# Patient Record
Sex: MALE | Race: WHITE | NOT HISPANIC OR LATINO | URBAN - METROPOLITAN AREA
[De-identification: names, ages, dates, MRNs, and addresses within clinical notes are randomized per-mention and may not be internally consistent; named-entity substitution may affect disease eponyms.]

---

## 2017-08-28 ENCOUNTER — COMMUNICATION - HEALTHEAST (OUTPATIENT)
Dept: SCHEDULING | Facility: CLINIC | Age: 52
End: 2017-08-28

## 2017-09-11 ENCOUNTER — OFFICE VISIT - HEALTHEAST (OUTPATIENT)
Dept: FAMILY MEDICINE | Facility: CLINIC | Age: 52
End: 2017-09-11

## 2017-09-11 DIAGNOSIS — K80.20 CHOLELITHIASIS: ICD-10-CM

## 2017-09-11 DIAGNOSIS — Z01.818 PREOP GENERAL PHYSICAL EXAM: ICD-10-CM

## 2017-09-11 DIAGNOSIS — Z00.00 HEALTHCARE MAINTENANCE: ICD-10-CM

## 2017-09-11 ASSESSMENT — MIFFLIN-ST. JEOR: SCORE: 1698.1

## 2017-09-12 ASSESSMENT — MIFFLIN-ST. JEOR: SCORE: 1696.29

## 2017-09-15 ENCOUNTER — SURGERY - HEALTHEAST (OUTPATIENT)
Dept: SURGERY | Facility: CLINIC | Age: 52
End: 2017-09-15

## 2017-09-15 ENCOUNTER — RECORDS - HEALTHEAST (OUTPATIENT)
Dept: ADMINISTRATIVE | Facility: OTHER | Age: 52
End: 2017-09-15

## 2017-09-15 ENCOUNTER — ANESTHESIA - HEALTHEAST (OUTPATIENT)
Dept: SURGERY | Facility: CLINIC | Age: 52
End: 2017-09-15

## 2017-09-15 ASSESSMENT — MIFFLIN-ST. JEOR: SCORE: 1696.29

## 2018-02-20 ENCOUNTER — COMMUNICATION - HEALTHEAST (OUTPATIENT)
Dept: FAMILY MEDICINE | Facility: CLINIC | Age: 53
End: 2018-02-20

## 2021-05-30 ENCOUNTER — RECORDS - HEALTHEAST (OUTPATIENT)
Dept: ADMINISTRATIVE | Facility: CLINIC | Age: 56
End: 2021-05-30

## 2021-05-31 VITALS — WEIGHT: 199 LBS | BODY MASS INDEX: 31.23 KG/M2 | HEIGHT: 67 IN

## 2021-05-31 VITALS — HEIGHT: 67 IN | WEIGHT: 199.4 LBS | BODY MASS INDEX: 31.3 KG/M2

## 2021-06-12 NOTE — PROGRESS NOTES
Assessment:     1. Preop general physical exam  - Hemoglobin    2. Cholelithiasis    3. Healthcare maintenance  - Td, Adult, Adsorbed (blue label)   51 y.o. male with planned surgery laparoscopic cholecystectomy for cholelithiasis.  Known risk factors for perioperative complications: None    Difficulty with intubation is not anticipated.    Cardiac Risk Estimation: Has no personal history of CVD or any symptoms or risks.Has no family history of CVD there is also no family history of blood clot as well as bleeding with surgery.  He has never had any surgery in the past..Thomas Brizuela is active and exercises adequately.Has more than 4 METs Metabolic equivalents.  Surgical risk: Class I  Perioperative cardiac risk: He has 0.4% risk of major cardiac event perioperatively.  There is no cardiovascular contraindication for the planned surgery.  Counseling done for adequate hydration and early ambulation.  Also discussed expectations postsurgery.    Current medications which may produce withdrawal symptoms if withheld perioperatively: None     Plan:      1. Preoperative workup as follows hemoglobin.  2. Change in medication regimen before surgery: not applicable, not on any medications.  3. Prophylaxis for cardiac events with perioperative beta-blockers: not indicated.  4. Invasive hemodynamic monitoring perioperatively: not indicated.  5. Deep vein thrombosis prophylaxis postoperatively:Not applicable..  6. Surveillance for postoperative MI with ECG immediately postoperatively and on postoperative days 1 and 2 AND troponin levels 24 hours postoperatively and on day 4 or hospital discharge (whichever comes first): not indicated.       Subjective:      Thomas Brizuela is a 51 y.o. male who presents to the office today for a preoperative consultation at the request of surgeon Dr Zelaya who plans on performing laparoscopic cholecystectomy on September 15.  He was diagnosed with cholelithiasis at the emergency  room following evaluation for chest pain that took the patient to the emergency room.  This consultation is requested for the specific conditions prompting preoperative evaluation (i.e. because of potential affect on operative risk): Cardiovascular risk stratification as well as review of comorbid medical concerns as needed.. Planned anesthesia: Possibly general anesthesia both at the discretion of the anesthetist and surgeon.. The patient has the following known anesthesia issues: No personal history of anesthesia complications, no family history also of anesthesia complications.. Patients bleeding risk: no remote history of abnormal bleeding.     History reviewed. No pertinent past medical history.  History reviewed. No pertinent surgical history.  Social History     Social History     Marital status:      Spouse name: N/A     Number of children: 1     Years of education: N/A     Social History Main Topics     Smoking status: Never Smoker     Smokeless tobacco: Never Used     Alcohol use Yes      Comment: Occasionally     Drug use: No     Sexual activity: Yes     Partners: Female     Other Topics Concern     None     Social History Narrative     Family History   Problem Relation Age of Onset     Thyroid disease Mother      COPD Mother      Colon polyps Father      Allergies   Allergen Reactions     No Known Drug Allergies      Other Environmental Allergy      Seasonal spring allergies     Current Outpatient Prescriptions   Medication Sig Dispense Refill     ondansetron (ZOFRAN ODT) 4 MG disintegrating tablet Take 1-2 tablets (4-8 mg total) by mouth every 8 (eight) hours as needed. 20 tablet 0     No current facility-administered medications for this visit.      Review of Systems  Constitutional: negative for anorexia, fatigue, sweats and weight loss  Eyes: negative  Ears, nose, mouth, throat, and face: negative  Respiratory: negative for asthma, cough and dyspnea on exertion  Cardiovascular: negative for  "chest pain, fatigue and palpitations  Gastrointestinal: negative  Genitourinary:negative  Integument/breast: negative  Hematologic/lymphatic: negative  Musculoskeletal:negative  Neurological: negative  Behavioral/Psych: negative  Endocrine: negative for diabetic symptoms including blurry vision, polyphagia, polyuria, poor wound healing and weight loss  Allergic/Immunologic: negative      Objective:      /70  Pulse 85  Temp 97.7  F (36.5  C) (Oral)   Ht 5' 7\" (1.702 m)  Wt 199 lb 6.4 oz (90.4 kg)  SpO2 97%  BMI 31.23 kg/m2  General appearance: alert, appears stated age and cooperative  Head: Normocephalic, without obvious abnormality, atraumatic  Eyes: conjunctivae/corneas clear. PERRL, EOM's intact. Fundi benign.  Ears: normal TM's and external ear canals both ears  Throat: lips, mucosa, and tongue normal; teeth and gums normal  Neck: no adenopathy, supple, symmetrical, trachea midline and thyroid not enlarged, symmetric, no tenderness/mass/nodules  Lungs: clear to auscultation bilaterally  Chest wall: no tenderness  Heart: regular rate and rhythm, S1, S2 normal, no murmur, click, rub or gallop  Abdomen: soft, non-tender; bowel sounds normal; no masses,  no organomegaly  Extremities: extremities normal, atraumatic, no cyanosis or edema  Pulses: 2+ and symmetric  Skin: Skin color, texture, turgor normal. No rashes or lesions  Lymph nodes: Negative cervical lymphadenopathy.  Neurologic: Alert and oriented X 3, normal strength and tone. Normal symmetric reflexes. Normal coordination and gait    Predictors of intubation difficulty:   Morbid obesity? no   Anatomically abnormal facies? no   Prominent incisors? no   Receding mandible? no   Short, thick neck? no   Neck range of motion: normal   Mallampati score: III (soft and hard palate and base of uvula visible)    Dentition: No chipped, loose, or missing teeth.    Cardiographics  ECG: See EKG report done 8/28/2017 which was normal sinus rhythm.  Lab: Awaiting " results of hemoglobin.

## 2021-06-13 NOTE — ANESTHESIA CARE TRANSFER NOTE
Last vitals:   Vitals:    09/15/17 1046   BP: 116/76   Pulse: 79   Resp: 12   Temp: 37.1  C (98.7  F)   SpO2: 97%     Patient's level of consciousness is drowsy  Spontaneous respirations: yes  Maintains airway independently: yes  Dentition unchanged: yes  Oropharynx: oropharynx clear of all foreign objects    QCDR Measures:  ASA# 20 - Surgical Safety Checklist: WHO surgical safety checklist completed prior to induction  PQRS# 430 - Adult PONV Prevention: 4558F - Pt received => 2 anti-emetic agents (different classes) preop & intraop  ASA# 8 - Peds PONV Prevention: NA - Not pediatric patient, not GA or 2 or more risk factors NOT present  PQRS# 424 - Marcela-op Temp Management: 4559F - At least one body temp DOCUMENTED => 35.5C or 95.9F within required timeframe  PQRS# 426 - PACU Transfer Protocol: - Transfer of care checklist used  ASA# 14 - Acute Post-op Pain: ASA14B - Patient did NOT experience pain >= 7 out of 10

## 2021-06-13 NOTE — ANESTHESIA PREPROCEDURE EVALUATION
Anesthesia Evaluation      Patient summary reviewed   No history of anesthetic complications     Airway   Mallampati: II  Neck ROM: full   Pulmonary - negative ROS and normal exam                          Cardiovascular - negative ROS and normal exam   Neuro/Psych - negative ROS     Endo/Other - negative ROS      GI/Hepatic/Renal - negative ROS           Dental                         Anesthesia Plan  Planned anesthetic: general endotracheal    ASA 1   Induction: intravenous   Anesthetic plan and risks discussed with: patient

## 2021-06-13 NOTE — ANESTHESIA POSTPROCEDURE EVALUATION
Patient: Thomas Brizuela  #3 CHOLECYSTECTOMY, LAPAROSCOPIC  Anesthesia type: general    Patient location: PACU  Last vitals:   Vitals:    09/15/17 1414   BP:    Pulse: 69   Resp: 14   Temp:    SpO2: 95%     Post vital signs: stable  Level of consciousness: awake and responds to simple questions  Post-anesthesia pain: pain controlled  Post-anesthesia nausea and vomiting: no  Pulmonary: unassisted, return to baseline  Cardiovascular: stable and blood pressure at baseline  Hydration: adequate  Anesthetic events: no    QCDR Measures:  ASA# 11 - Marcela-op Cardiac Arrest: ASA11B - Patient did NOT experience unanticipated cardiac arrest  ASA# 12 - Marcela-op Mortality Rate: ASA12B - Patient did NOT die  ASA# 13 - PACU Re-Intubation Rate: ASA13B - Patient did NOT require a new airway mgmt  ASA# 10 - Composite Anes Safety: ASA10A - No serious adverse event    Additional Notes: